# Patient Record
Sex: MALE | Race: OTHER | HISPANIC OR LATINO | Employment: UNEMPLOYED | URBAN - METROPOLITAN AREA
[De-identification: names, ages, dates, MRNs, and addresses within clinical notes are randomized per-mention and may not be internally consistent; named-entity substitution may affect disease eponyms.]

---

## 2020-07-21 ENCOUNTER — HOSPITAL ENCOUNTER (INPATIENT)
Facility: MEDICAL CENTER | Age: 20
LOS: 2 days | DRG: 683 | End: 2020-07-23
Attending: EMERGENCY MEDICINE | Admitting: HOSPITALIST

## 2020-07-21 ENCOUNTER — APPOINTMENT (OUTPATIENT)
Dept: RADIOLOGY | Facility: MEDICAL CENTER | Age: 20
DRG: 683 | End: 2020-07-21
Attending: EMERGENCY MEDICINE

## 2020-07-21 DIAGNOSIS — N17.9 ACUTE RENAL FAILURE, UNSPECIFIED ACUTE RENAL FAILURE TYPE (HCC): ICD-10-CM

## 2020-07-21 DIAGNOSIS — R10.84 GENERALIZED ABDOMINAL PAIN: ICD-10-CM

## 2020-07-21 DIAGNOSIS — R11.2 NON-INTRACTABLE VOMITING WITH NAUSEA, UNSPECIFIED VOMITING TYPE: ICD-10-CM

## 2020-07-21 LAB
ALBUMIN SERPL BCP-MCNC: 6.7 G/DL (ref 3.2–4.9)
ALBUMIN/GLOB SERPL: 1.9 G/DL
ALP SERPL-CCNC: 122 U/L (ref 30–99)
ALT SERPL-CCNC: 24 U/L (ref 2–50)
ANION GAP SERPL CALC-SCNC: 31 MMOL/L (ref 7–16)
APAP SERPL-MCNC: <5 UG/ML (ref 10–30)
APPEARANCE UR: ABNORMAL
AST SERPL-CCNC: 48 U/L (ref 12–45)
BACTERIA #/AREA URNS HPF: ABNORMAL /HPF
BASOPHILS # BLD AUTO: 0.5 % (ref 0–1.8)
BASOPHILS # BLD: 0.06 K/UL (ref 0–0.12)
BILIRUB SERPL-MCNC: 1.5 MG/DL (ref 0.1–1.5)
BILIRUB UR QL STRIP.AUTO: ABNORMAL
BLOOD CULTURE HOLD CXBCH: NORMAL
BLOOD CULTURE HOLD CXBCH: NORMAL
BUN SERPL-MCNC: 40 MG/DL (ref 8–22)
CALCIUM SERPL-MCNC: 11.1 MG/DL (ref 8.5–10.5)
CHLORIDE SERPL-SCNC: 82 MMOL/L (ref 96–112)
CO2 SERPL-SCNC: 20 MMOL/L (ref 20–33)
COLOR UR: ABNORMAL
CREAT SERPL-MCNC: 4.31 MG/DL (ref 0.5–1.4)
EKG IMPRESSION: NORMAL
EOSINOPHIL # BLD AUTO: 0.11 K/UL (ref 0–0.51)
EOSINOPHIL NFR BLD: 0.9 % (ref 0–6.9)
EPI CELLS #/AREA URNS HPF: ABNORMAL /HPF
ERYTHROCYTE [DISTWIDTH] IN BLOOD BY AUTOMATED COUNT: 38.5 FL (ref 35.9–50)
GLOBULIN SER CALC-MCNC: 3.5 G/DL (ref 1.9–3.5)
GLUCOSE SERPL-MCNC: 107 MG/DL (ref 65–99)
GLUCOSE UR STRIP.AUTO-MCNC: NEGATIVE MG/DL
HCT VFR BLD AUTO: 58.5 % (ref 42–52)
HGB BLD-MCNC: 19.5 G/DL (ref 14–18)
IMM GRANULOCYTES # BLD AUTO: 0.05 K/UL (ref 0–0.11)
IMM GRANULOCYTES NFR BLD AUTO: 0.4 % (ref 0–0.9)
KETONES UR STRIP.AUTO-MCNC: ABNORMAL MG/DL
LEUKOCYTE ESTERASE UR QL STRIP.AUTO: NEGATIVE
LIPASE SERPL-CCNC: 14 U/L (ref 11–82)
LYMPHOCYTES # BLD AUTO: 2.75 K/UL (ref 1–4.8)
LYMPHOCYTES NFR BLD: 22 % (ref 22–41)
MAGNESIUM SERPL-MCNC: 2.6 MG/DL (ref 1.5–2.5)
MCH RBC QN AUTO: 28.7 PG (ref 27–33)
MCHC RBC AUTO-ENTMCNC: 33.3 G/DL (ref 33.7–35.3)
MCV RBC AUTO: 86.2 FL (ref 81.4–97.8)
MICRO URNS: ABNORMAL
MONOCYTES # BLD AUTO: 1.33 K/UL (ref 0–0.85)
MONOCYTES NFR BLD AUTO: 10.6 % (ref 0–13.4)
NEUTROPHILS # BLD AUTO: 8.19 K/UL (ref 1.82–7.42)
NEUTROPHILS NFR BLD: 65.6 % (ref 44–72)
NITRITE UR QL STRIP.AUTO: NEGATIVE
NRBC # BLD AUTO: 0 K/UL
NRBC BLD-RTO: 0 /100 WBC
PH UR STRIP.AUTO: 5 [PH] (ref 5–8)
PLATELET # BLD AUTO: 311 K/UL (ref 164–446)
PMV BLD AUTO: 10.9 FL (ref 9–12.9)
POTASSIUM SERPL-SCNC: 4.7 MMOL/L (ref 3.6–5.5)
PROT SERPL-MCNC: 10.2 G/DL (ref 6–8.2)
PROT UR QL STRIP: 100 MG/DL
RBC # BLD AUTO: 6.79 M/UL (ref 4.7–6.1)
RBC # URNS HPF: ABNORMAL /HPF
RBC UR QL AUTO: NEGATIVE
SALICYLATES SERPL-MCNC: <1 MG/DL (ref 15–25)
SODIUM SERPL-SCNC: 133 MMOL/L (ref 135–145)
SP GR UR STRIP.AUTO: 1.02
UROBILINOGEN UR STRIP.AUTO-MCNC: 1 MG/DL
WBC # BLD AUTO: 12.5 K/UL (ref 4.8–10.8)
WBC #/AREA URNS HPF: ABNORMAL /HPF

## 2020-07-21 PROCEDURE — 76775 US EXAM ABDO BACK WALL LIM: CPT

## 2020-07-21 PROCEDURE — 770006 HCHG ROOM/CARE - MED/SURG/GYN SEMI*

## 2020-07-21 PROCEDURE — 700105 HCHG RX REV CODE 258: Performed by: STUDENT IN AN ORGANIZED HEALTH CARE EDUCATION/TRAINING PROGRAM

## 2020-07-21 PROCEDURE — 99285 EMERGENCY DEPT VISIT HI MDM: CPT

## 2020-07-21 PROCEDURE — 93005 ELECTROCARDIOGRAM TRACING: CPT | Performed by: EMERGENCY MEDICINE

## 2020-07-21 PROCEDURE — 82803 BLOOD GASES ANY COMBINATION: CPT

## 2020-07-21 PROCEDURE — 96375 TX/PRO/DX INJ NEW DRUG ADDON: CPT

## 2020-07-21 PROCEDURE — 80053 COMPREHEN METABOLIC PANEL: CPT

## 2020-07-21 PROCEDURE — 74176 CT ABD & PELVIS W/O CONTRAST: CPT

## 2020-07-21 PROCEDURE — 700111 HCHG RX REV CODE 636 W/ 250 OVERRIDE (IP): Performed by: EMERGENCY MEDICINE

## 2020-07-21 PROCEDURE — 700105 HCHG RX REV CODE 258: Performed by: EMERGENCY MEDICINE

## 2020-07-21 PROCEDURE — 96365 THER/PROPH/DIAG IV INF INIT: CPT

## 2020-07-21 PROCEDURE — 83690 ASSAY OF LIPASE: CPT

## 2020-07-21 PROCEDURE — 81001 URINALYSIS AUTO W/SCOPE: CPT

## 2020-07-21 PROCEDURE — 36415 COLL VENOUS BLD VENIPUNCTURE: CPT

## 2020-07-21 PROCEDURE — 85025 COMPLETE CBC W/AUTO DIFF WBC: CPT

## 2020-07-21 PROCEDURE — 93005 ELECTROCARDIOGRAM TRACING: CPT

## 2020-07-21 PROCEDURE — 96367 TX/PROPH/DG ADDL SEQ IV INF: CPT

## 2020-07-21 PROCEDURE — 700101 HCHG RX REV CODE 250: Performed by: EMERGENCY MEDICINE

## 2020-07-21 PROCEDURE — 80307 DRUG TEST PRSMV CHEM ANLYZR: CPT

## 2020-07-21 PROCEDURE — 83735 ASSAY OF MAGNESIUM: CPT

## 2020-07-21 RX ORDER — SODIUM CHLORIDE, SODIUM LACTATE, POTASSIUM CHLORIDE, AND CALCIUM CHLORIDE .6; .31; .03; .02 G/100ML; G/100ML; G/100ML; G/100ML
1000 INJECTION, SOLUTION INTRAVENOUS ONCE
Status: DISCONTINUED | OUTPATIENT
Start: 2020-07-21 | End: 2020-07-21

## 2020-07-21 RX ORDER — SODIUM CHLORIDE 9 MG/ML
1000 INJECTION, SOLUTION INTRAVENOUS ONCE
Status: COMPLETED | OUTPATIENT
Start: 2020-07-21 | End: 2020-07-21

## 2020-07-21 RX ORDER — POLYETHYLENE GLYCOL 3350 17 G/17G
1 POWDER, FOR SOLUTION ORAL
Status: DISCONTINUED | OUTPATIENT
Start: 2020-07-21 | End: 2020-07-23 | Stop reason: HOSPADM

## 2020-07-21 RX ORDER — ONDANSETRON 2 MG/ML
4 INJECTION INTRAMUSCULAR; INTRAVENOUS ONCE
Status: COMPLETED | OUTPATIENT
Start: 2020-07-21 | End: 2020-07-21

## 2020-07-21 RX ORDER — LABETALOL HYDROCHLORIDE 5 MG/ML
10 INJECTION, SOLUTION INTRAVENOUS EVERY 4 HOURS PRN
Status: DISCONTINUED | OUTPATIENT
Start: 2020-07-21 | End: 2020-07-23 | Stop reason: HOSPADM

## 2020-07-21 RX ORDER — MORPHINE SULFATE 4 MG/ML
4 INJECTION, SOLUTION INTRAMUSCULAR; INTRAVENOUS ONCE
Status: COMPLETED | OUTPATIENT
Start: 2020-07-21 | End: 2020-07-21

## 2020-07-21 RX ORDER — ACETAMINOPHEN 325 MG/1
650 TABLET ORAL EVERY 6 HOURS PRN
Status: DISCONTINUED | OUTPATIENT
Start: 2020-07-21 | End: 2020-07-23 | Stop reason: HOSPADM

## 2020-07-21 RX ORDER — SODIUM CHLORIDE, SODIUM LACTATE, POTASSIUM CHLORIDE, CALCIUM CHLORIDE 600; 310; 30; 20 MG/100ML; MG/100ML; MG/100ML; MG/100ML
1000 INJECTION, SOLUTION INTRAVENOUS ONCE
Status: COMPLETED | OUTPATIENT
Start: 2020-07-21 | End: 2020-07-21

## 2020-07-21 RX ORDER — HEPARIN SODIUM 5000 [USP'U]/ML
5000 INJECTION, SOLUTION INTRAVENOUS; SUBCUTANEOUS EVERY 8 HOURS
Status: DISCONTINUED | OUTPATIENT
Start: 2020-07-21 | End: 2020-07-23 | Stop reason: HOSPADM

## 2020-07-21 RX ORDER — SODIUM CHLORIDE 9 MG/ML
3000 INJECTION, SOLUTION INTRAVENOUS CONTINUOUS
Status: DISCONTINUED | OUTPATIENT
Start: 2020-07-21 | End: 2020-07-22

## 2020-07-21 RX ORDER — AMOXICILLIN 250 MG
2 CAPSULE ORAL 2 TIMES DAILY
Status: DISCONTINUED | OUTPATIENT
Start: 2020-07-21 | End: 2020-07-23 | Stop reason: HOSPADM

## 2020-07-21 RX ORDER — BISACODYL 10 MG
10 SUPPOSITORY, RECTAL RECTAL
Status: DISCONTINUED | OUTPATIENT
Start: 2020-07-21 | End: 2020-07-23 | Stop reason: HOSPADM

## 2020-07-21 RX ADMIN — SODIUM CHLORIDE 1000 ML: 9 INJECTION, SOLUTION INTRAVENOUS at 21:04

## 2020-07-21 RX ADMIN — CEFTRIAXONE SODIUM 2 G: 2 INJECTION, POWDER, FOR SOLUTION INTRAMUSCULAR; INTRAVENOUS at 18:30

## 2020-07-21 RX ADMIN — MORPHINE SULFATE 4 MG: 4 INJECTION INTRAVENOUS at 18:20

## 2020-07-21 RX ADMIN — SODIUM CHLORIDE 1000 ML: 9 INJECTION, SOLUTION INTRAVENOUS at 23:54

## 2020-07-21 RX ADMIN — ONDANSETRON 4 MG: 2 INJECTION INTRAMUSCULAR; INTRAVENOUS at 18:20

## 2020-07-21 RX ADMIN — SODIUM CHLORIDE, POTASSIUM CHLORIDE, SODIUM LACTATE AND CALCIUM CHLORIDE 1000 ML: 600; 310; 30; 20 INJECTION, SOLUTION INTRAVENOUS at 18:24

## 2020-07-21 RX ADMIN — METRONIDAZOLE 500 MG: 500 INJECTION, SOLUTION INTRAVENOUS at 19:03

## 2020-07-21 SDOH — HEALTH STABILITY: MENTAL HEALTH: HOW OFTEN DO YOU HAVE A DRINK CONTAINING ALCOHOL?: 2-4 TIMES A MONTH

## 2020-07-21 NOTE — ED TRIAGE NOTES
Pt to triage via w/c c/o epigastric pain with vomiting since yesterday. Pt states unable to hold down water. Pt denies diarrhea. Pt denies fever. Denies drinking or drugs

## 2020-07-22 PROBLEM — E87.8 HYPOCHLOREMIA: Status: ACTIVE | Noted: 2020-07-22

## 2020-07-22 PROBLEM — N39.0 URINARY TRACT INFECTION: Status: ACTIVE | Noted: 2020-07-22

## 2020-07-22 PROBLEM — D75.1 POLYCYTHEMIA: Status: ACTIVE | Noted: 2020-07-22

## 2020-07-22 PROBLEM — N17.9 ACUTE KIDNEY INJURY (HCC): Status: ACTIVE | Noted: 2020-07-22

## 2020-07-22 PROBLEM — E87.29 HIGH ANION GAP METABOLIC ACIDOSIS: Status: ACTIVE | Noted: 2020-07-22

## 2020-07-22 LAB
ALBUMIN SERPL BCP-MCNC: 4.3 G/DL (ref 3.2–4.9)
ALBUMIN/GLOB SERPL: 1.5 G/DL
ALP SERPL-CCNC: 85 U/L (ref 30–99)
ALT SERPL-CCNC: 16 U/L (ref 2–50)
AMPHET UR QL SCN: POSITIVE
ANION GAP SERPL CALC-SCNC: 17 MMOL/L (ref 7–16)
ANION GAP SERPL CALC-SCNC: 17 MMOL/L (ref 7–16)
AST SERPL-CCNC: 34 U/L (ref 12–45)
BARBITURATES UR QL SCN: NEGATIVE
BASE EXCESS BLDA CALC-SCNC: -2 MMOL/L (ref -4–3)
BASOPHILS # BLD AUTO: 0.6 % (ref 0–1.8)
BASOPHILS # BLD: 0.07 K/UL (ref 0–0.12)
BENZODIAZ UR QL SCN: NEGATIVE
BILIRUB SERPL-MCNC: 1.3 MG/DL (ref 0.1–1.5)
BODY TEMPERATURE: NORMAL CENTIGRADE
BUN SERPL-MCNC: 31 MG/DL (ref 8–22)
BUN SERPL-MCNC: 32 MG/DL (ref 8–22)
BZE UR QL SCN: POSITIVE
CALCIUM SERPL-MCNC: 9.3 MG/DL (ref 8.5–10.5)
CALCIUM SERPL-MCNC: 9.3 MG/DL (ref 8.5–10.5)
CANNABINOIDS UR QL SCN: POSITIVE
CHLORIDE SERPL-SCNC: 96 MMOL/L (ref 96–112)
CHLORIDE SERPL-SCNC: 98 MMOL/L (ref 96–112)
CK MB SERPL-MCNC: 6.3 NG/ML (ref 0–5)
CK SERPL-CCNC: 447 U/L (ref 0–154)
CO2 SERPL-SCNC: 22 MMOL/L (ref 20–33)
CO2 SERPL-SCNC: 22 MMOL/L (ref 20–33)
CREAT SERPL-MCNC: 1.74 MG/DL (ref 0.5–1.4)
CREAT SERPL-MCNC: 1.78 MG/DL (ref 0.5–1.4)
EOSINOPHIL # BLD AUTO: 0.15 K/UL (ref 0–0.51)
EOSINOPHIL NFR BLD: 1.4 % (ref 0–6.9)
ERYTHROCYTE [DISTWIDTH] IN BLOOD BY AUTOMATED COUNT: 40.3 FL (ref 35.9–50)
ETHANOL BLD-MCNC: <10.1 MG/DL (ref 0–10.1)
GLOBULIN SER CALC-MCNC: 2.9 G/DL (ref 1.9–3.5)
GLUCOSE SERPL-MCNC: 94 MG/DL (ref 65–99)
GLUCOSE SERPL-MCNC: 97 MG/DL (ref 65–99)
HCO3 BLDA-SCNC: 22 MMOL/L (ref 17–25)
HCT VFR BLD AUTO: 47.6 % (ref 42–52)
HGB BLD-MCNC: 15.4 G/DL (ref 14–18)
IMM GRANULOCYTES # BLD AUTO: 0.04 K/UL (ref 0–0.11)
IMM GRANULOCYTES NFR BLD AUTO: 0.4 % (ref 0–0.9)
LACTATE BLD-SCNC: 0.9 MMOL/L (ref 0.5–2)
LACTATE BLD-SCNC: 1.3 MMOL/L (ref 0.5–2)
LACTATE BLD-SCNC: 1.5 MMOL/L (ref 0.5–2)
LYMPHOCYTES # BLD AUTO: 2.75 K/UL (ref 1–4.8)
LYMPHOCYTES NFR BLD: 25.2 % (ref 22–41)
MCH RBC QN AUTO: 28.6 PG (ref 27–33)
MCHC RBC AUTO-ENTMCNC: 32.4 G/DL (ref 33.7–35.3)
MCV RBC AUTO: 88.5 FL (ref 81.4–97.8)
METHADONE UR QL SCN: NEGATIVE
MONOCYTES # BLD AUTO: 1.37 K/UL (ref 0–0.85)
MONOCYTES NFR BLD AUTO: 12.5 % (ref 0–13.4)
NEUTROPHILS # BLD AUTO: 6.54 K/UL (ref 1.82–7.42)
NEUTROPHILS NFR BLD: 59.9 % (ref 44–72)
NRBC # BLD AUTO: 0 K/UL
NRBC BLD-RTO: 0 /100 WBC
OPIATES UR QL SCN: NEGATIVE
OSMOLALITY SERPL: 294 MOSM/KG H2O (ref 278–298)
OXYCODONE UR QL SCN: NEGATIVE
PCO2 BLDA: 36.4 MMHG (ref 26–37)
PCP UR QL SCN: NEGATIVE
PH BLDA: 7.41 [PH] (ref 7.4–7.5)
PLATELET # BLD AUTO: 234 K/UL (ref 164–446)
PMV BLD AUTO: 11.1 FL (ref 9–12.9)
PO2 BLDA: 86.3 MMHG (ref 64–87)
POTASSIUM SERPL-SCNC: 4.1 MMOL/L (ref 3.6–5.5)
POTASSIUM SERPL-SCNC: 4.2 MMOL/L (ref 3.6–5.5)
PROPOXYPH UR QL SCN: NEGATIVE
PROT SERPL-MCNC: 7.2 G/DL (ref 6–8.2)
RBC # BLD AUTO: 5.38 M/UL (ref 4.7–6.1)
SAO2 % BLDA: 95.6 % (ref 93–99)
SODIUM SERPL-SCNC: 135 MMOL/L (ref 135–145)
SODIUM SERPL-SCNC: 137 MMOL/L (ref 135–145)
WBC # BLD AUTO: 10.9 K/UL (ref 4.8–10.8)

## 2020-07-22 PROCEDURE — A9270 NON-COVERED ITEM OR SERVICE: HCPCS | Performed by: STUDENT IN AN ORGANIZED HEALTH CARE EDUCATION/TRAINING PROGRAM

## 2020-07-22 PROCEDURE — 80048 BASIC METABOLIC PNL TOTAL CA: CPT

## 2020-07-22 PROCEDURE — 700102 HCHG RX REV CODE 250 W/ 637 OVERRIDE(OP): Performed by: STUDENT IN AN ORGANIZED HEALTH CARE EDUCATION/TRAINING PROGRAM

## 2020-07-22 PROCEDURE — 36415 COLL VENOUS BLD VENIPUNCTURE: CPT

## 2020-07-22 PROCEDURE — 83605 ASSAY OF LACTIC ACID: CPT | Mod: 91

## 2020-07-22 PROCEDURE — 82550 ASSAY OF CK (CPK): CPT

## 2020-07-22 PROCEDURE — 770006 HCHG ROOM/CARE - MED/SURG/GYN SEMI*

## 2020-07-22 PROCEDURE — 82553 CREATINE MB FRACTION: CPT

## 2020-07-22 PROCEDURE — 80053 COMPREHEN METABOLIC PANEL: CPT

## 2020-07-22 PROCEDURE — 83930 ASSAY OF BLOOD OSMOLALITY: CPT

## 2020-07-22 PROCEDURE — 700111 HCHG RX REV CODE 636 W/ 250 OVERRIDE (IP): Performed by: STUDENT IN AN ORGANIZED HEALTH CARE EDUCATION/TRAINING PROGRAM

## 2020-07-22 PROCEDURE — 80307 DRUG TEST PRSMV CHEM ANLYZR: CPT

## 2020-07-22 PROCEDURE — 99223 1ST HOSP IP/OBS HIGH 75: CPT | Performed by: HOSPITALIST

## 2020-07-22 PROCEDURE — 85025 COMPLETE CBC W/AUTO DIFF WBC: CPT

## 2020-07-22 PROCEDURE — 700105 HCHG RX REV CODE 258: Performed by: STUDENT IN AN ORGANIZED HEALTH CARE EDUCATION/TRAINING PROGRAM

## 2020-07-22 PROCEDURE — 82668 ASSAY OF ERYTHROPOIETIN: CPT

## 2020-07-22 RX ADMIN — DOCUSATE SODIUM 50 MG AND SENNOSIDES 8.6 MG 2 TABLET: 8.6; 5 TABLET, FILM COATED ORAL at 05:56

## 2020-07-22 RX ADMIN — HEPARIN SODIUM 5000 UNITS: 5000 INJECTION, SOLUTION INTRAVENOUS; SUBCUTANEOUS at 00:00

## 2020-07-22 RX ADMIN — DOCUSATE SODIUM 50 MG AND SENNOSIDES 8.6 MG 2 TABLET: 8.6; 5 TABLET, FILM COATED ORAL at 17:38

## 2020-07-22 RX ADMIN — HEPARIN SODIUM 5000 UNITS: 5000 INJECTION, SOLUTION INTRAVENOUS; SUBCUTANEOUS at 05:56

## 2020-07-22 RX ADMIN — HEPARIN SODIUM 5000 UNITS: 5000 INJECTION, SOLUTION INTRAVENOUS; SUBCUTANEOUS at 14:56

## 2020-07-22 RX ADMIN — HEPARIN SODIUM 5000 UNITS: 5000 INJECTION, SOLUTION INTRAVENOUS; SUBCUTANEOUS at 21:30

## 2020-07-22 RX ADMIN — DOCUSATE SODIUM 50 MG AND SENNOSIDES 8.6 MG 2 TABLET: 8.6; 5 TABLET, FILM COATED ORAL at 00:00

## 2020-07-22 RX ADMIN — SODIUM CHLORIDE 3000 ML: 9 INJECTION, SOLUTION INTRAVENOUS at 01:23

## 2020-07-22 SDOH — ECONOMIC STABILITY: FOOD INSECURITY: WITHIN THE PAST 12 MONTHS, THE FOOD YOU BOUGHT JUST DIDN'T LAST AND YOU DIDN'T HAVE MONEY TO GET MORE.: NEVER TRUE

## 2020-07-22 SDOH — ECONOMIC STABILITY: FOOD INSECURITY: WITHIN THE PAST 12 MONTHS, YOU WORRIED THAT YOUR FOOD WOULD RUN OUT BEFORE YOU GOT MONEY TO BUY MORE.: NEVER TRUE

## 2020-07-22 SDOH — ECONOMIC STABILITY: TRANSPORTATION INSECURITY
IN THE PAST 12 MONTHS, HAS LACK OF TRANSPORTATION KEPT YOU FROM MEETINGS, WORK, OR FROM GETTING THINGS NEEDED FOR DAILY LIVING?: NO

## 2020-07-22 SDOH — HEALTH STABILITY: MENTAL HEALTH: HOW OFTEN DO YOU HAVE 6 OR MORE DRINKS ON ONE OCCASION?: MONTHLY

## 2020-07-22 SDOH — ECONOMIC STABILITY: TRANSPORTATION INSECURITY
IN THE PAST 12 MONTHS, HAS THE LACK OF TRANSPORTATION KEPT YOU FROM MEDICAL APPOINTMENTS OR FROM GETTING MEDICATIONS?: NO

## 2020-07-22 SDOH — HEALTH STABILITY: MENTAL HEALTH: HOW MANY STANDARD DRINKS CONTAINING ALCOHOL DO YOU HAVE ON A TYPICAL DAY?: 5 OR 6

## 2020-07-22 ASSESSMENT — LIFESTYLE VARIABLES
TOTAL SCORE: 3
HOW MANY TIMES IN THE PAST YEAR HAVE YOU HAD 5 OR MORE DRINKS IN A DAY: 12
AVERAGE NUMBER OF DAYS PER WEEK YOU HAVE A DRINK CONTAINING ALCOHOL: 0.13
HAVE PEOPLE ANNOYED YOU BY CRITICIZING YOUR DRINKING: YES
TOTAL SCORE: 3
TOTAL SCORE: 3
EVER FELT BAD OR GUILTY ABOUT YOUR DRINKING: NO
ALCOHOL_USE: YES
CONSUMPTION TOTAL: POSITIVE
EVER_SMOKED: NEVER
HAVE YOU EVER FELT YOU SHOULD CUT DOWN ON YOUR DRINKING: YES
DOES PATIENT WANT TO TALK TO SOMEONE ABOUT QUITTING: YES
SUBSTANCE_ABUSE: 1
DOES PATIENT WANT TO STOP DRINKING: YES
ON A TYPICAL DAY WHEN YOU DRINK ALCOHOL HOW MANY DRINKS DO YOU HAVE: 6
EVER HAD A DRINK FIRST THING IN THE MORNING TO STEADY YOUR NERVES TO GET RID OF A HANGOVER: YES

## 2020-07-22 ASSESSMENT — COGNITIVE AND FUNCTIONAL STATUS - GENERAL
SUGGESTED CMS G CODE MODIFIER MOBILITY: CH
SUGGESTED CMS G CODE MODIFIER DAILY ACTIVITY: CH
MOBILITY SCORE: 24
DAILY ACTIVITIY SCORE: 24

## 2020-07-22 ASSESSMENT — ENCOUNTER SYMPTOMS
DIZZINESS: 1
PALPITATIONS: 0
WEAKNESS: 0
BLURRED VISION: 0
SENSORY CHANGE: 0
ABDOMINAL PAIN: 1
HEADACHES: 0
SHORTNESS OF BREATH: 0
WHEEZING: 0
COUGH: 0
NECK PAIN: 0
WEIGHT LOSS: 0
FEVER: 0
NERVOUS/ANXIOUS: 0
NAUSEA: 1
CHILLS: 0
DOUBLE VISION: 0
CONSTIPATION: 0
DIARRHEA: 0
MYALGIAS: 0
SORE THROAT: 0
BLOOD IN STOOL: 0
VOMITING: 1

## 2020-07-22 ASSESSMENT — PATIENT HEALTH QUESTIONNAIRE - PHQ9
1. LITTLE INTEREST OR PLEASURE IN DOING THINGS: NOT AT ALL
SUM OF ALL RESPONSES TO PHQ9 QUESTIONS 1 AND 2: 0
2. FEELING DOWN, DEPRESSED, IRRITABLE, OR HOPELESS: NOT AT ALL

## 2020-07-22 NOTE — ED NOTES
Med rec updated and complete. Allergies reviewed.   Pt denies taking medications.  No preferred home pharmacy

## 2020-07-22 NOTE — PROGRESS NOTES
2 RN Skin Check    2 RN skin check completed with Alexandra HADLEY.  Devices in place: 2 PIV  Skin assessed under devices: Yes  Confirmed pressure ulcers found on: N/A.  New potential pressure ulcers noted on N/A. Wound consult placed N/A      The following interventions in place : pillows for support/positioning, encourage frequent turns    Skin Assessment    Ears pink, blanching.  Bilateral elbows pink, blanching.  Sacrum pink, blanching.  Bilateral heels pink, blanching.

## 2020-07-22 NOTE — ED NOTES
Pt report called in to Rebeka HADLEY, questions and comments addressed. Pt now awaiting transport to [place] [id number].

## 2020-07-22 NOTE — ASSESSMENT & PLAN NOTE
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  -Drugs: pt has polysubstance abuse, including crystal meth, and UDS (+) for cannabis, cocaine, amphetamines  -RPGN: IgA nephropathy (pt reports cafecolored urine)    Lower on differential:  -Unlikely malignancy as CBC wnl  -Infxn as WBC wnl  -TTP/HUS r/o as CBC is wnl    --> F/u on urine qual exam and CKMB (r/o myoglobinuria w/ rhabdo)  --> No casts or crystals on UA  --> F/u on acetaminophen, salicylate labs  --> Day team to consider kidney biopsy for r/o of IgA nephropathy  --> Day team to consider another doppler flow studies of renal arteries to look for renal artery stenosis, or vein thrombosis    -->Continue aggressive fluid hydration  -->Does not meet any indication for emergent dialysis

## 2020-07-22 NOTE — ED NOTES
Pt w/c to [facility]. Agree with triage note. Pt provided urine sample, specimen sent to lab. Pt changed into gown and placed on monitor.  Chart up and ready for ERP now.

## 2020-07-22 NOTE — ED PROVIDER NOTES
ED Provider Note    Scribed for Omari Hastings M.D. by Patricia Oswald. 7/21/2020  6:21 PM    Means of arrival: wheel chair  History obtained from: Patient  History limited by: None    CHIEF COMPLAINT  Chief Complaint   Patient presents with   • Abdominal Pain   • N/V       HPI  Aki Duron is a 19 y.o. male who presents to the Emergency Department for evaluation of vomiting which began yesterday and increased in frequency and severity today and was associated with diffuse abdominal pain which failed to localize in the last 24 hours and was not associated with fever.  The patient denies any toxic ingestion, drug use pattern except for marijuana but does admit to some sort of muscle building aid over-the-counter.  Patient moved to the [facility] from [place] 1 year ago.  Does not have any exposure history that is concerning    REVIEW OF SYSTEMS  Pertinent negatives include no fever. As above, all other systems reviewed and are negative.   See HPI for further details.     PAST MEDICAL HISTORY   No pertinent past medical history noted    SURGICAL HISTORY  patient denies any surgical history    SOCIAL HISTORY  Social History     Tobacco Use   • Smoking status: Never Smoker   • Smokeless tobacco: Never Used   Substance Use Topics   • Alcohol use: Yes     Frequency: 2-4 times a month   • Drug use: Not Currently      Social History     Substance and Sexual Activity   Drug Use Not Currently       FAMILY HISTORY  History reviewed. No pertinent family history.    CURRENT MEDICATIONS  Home Medications     Reviewed by Rajni Shelton (Pharmacy Tech) on 07/21/20 at 2044  Med List Status: Partial   Medication Last Dose Status        Patient Js Taking any Medications                       ALLERGIES  No Known Allergies    PHYSICAL EXAM  VITAL SIGNS: /88   Pulse (!) 153   Temp 37 °C (98.6 °F) (Oral)   Resp (!) 24   Wt 72.6 kg (160 lb)   SpO2 99%   Constitutional: Well developed, Well nourished, No acute distress,  Non-toxic appearance.   HENT: Normocephalic, Atraumatic, Bilateral external ears normal, Oropharynx is clear mucous membranes are dry. No oral exudates or nasal discharge.   Eyes: Pupils are equal round and reactive, EOMI, Conjunctiva normal, No discharge.   Neck: Normal range of motion, No tenderness, Supple, No stridor. No meningismus.  Lymphatic: No lymphadenopathy noted.   Cardiovascular: Tachycardic rate and rhythm without murmur rub or gallop.  Thorax & Lungs: Clear breath sounds bilaterally without wheezes, rhonchi or rales. There is no chest wall tenderness.   Abdomen: Soft with diffuse tenderness, the abdomen is otherwise scaphoid and non-distended. There is no rebound or guarding. No organomegaly is appreciated. Bowel sounds are normal.  Skin: Normal without rash.   Back: No CVA or spinal tenderness.   Extremities: Intact distal pulses, No edema, No tenderness, No cyanosis, No clubbing. Capillary refill is less than 2 seconds.  Musculoskeletal: Good range of motion in all major joints. No tenderness to palpation or major deformities noted.   Neurologic: Alert & oriented x 3, Normal motor function, Normal sensory function, No focal deficits noted. Reflexes are normal.  Psychiatric: Affect normal, Judgment normal, Mood normal. There is no suicidal ideation or patient reported hallucinations.       DIAGNOSTIC STUDIES / PROCEDURES    LABS  Labs Reviewed   CBC WITH DIFFERENTIAL - Abnormal; Notable for the following components:       Result Value    WBC 12.5 (*)     RBC 6.79 (*)     Hemoglobin 19.5 (*)     Hematocrit 58.5 (*)     MCHC 33.3 (*)     Neutrophils (Absolute) 8.19 (*)     Monos (Absolute) 1.33 (*)     All other components within normal limits   COMP METABOLIC PANEL - Abnormal; Notable for the following components:    Sodium 133 (*)     Chloride 82 (*)     Anion Gap 31.0 (*)     Glucose 107 (*)     Bun 40 (*)     Creatinine 4.31 (*)     Calcium 11.1 (*)     AST(SGOT) 48 (*)     Alkaline Phosphatase 122  (*)     Albumin 6.7 (*)     Total Protein 10.2 (*)     All other components within normal limits   URINALYSIS,CULTURE IF INDICATED - Abnormal; Notable for the following components:    Character Turbid (*)     Ketones Trace (*)     Protein 100 (*)     Bilirubin Moderate (*)     All other components within normal limits   URINE MICROSCOPIC (W/UA) - Abnormal; Notable for the following components:    WBC 2-5 (*)     RBC 2-5 (*)     Bacteria Few (*)     All other components within normal limits   ESTIMATED GFR - Abnormal; Notable for the following components:    GFR If  21 (*)     GFR If Non  18 (*)     All other components within normal limits   LIPASE   BLOOD CULTURE,HOLD   BLOOD CULTURE,HOLD   URINALYSIS      All labs reviewed by me.    EKG Interpretation:  Results for orders placed or performed during the hospital encounter of 20   EKG   Result Value Ref Range    Report       [facility]    Test Date:  2020  Pt Name:    JAYSON DEAL                Department: ER  MRN:        [id number]                      Room:  Gender:     Male                         Technician: [id number]  :        2000                   Requested By:ER TRIAGE PROTOCOL  Order #:    [id number]                    Reading MD:    Measurements  Intervals                                Axis  Rate:       82                           P:          26  FL:         108                          QRS:        113  QRSD:       94                           T:          54  QT:         396  QTc:        463    Interpretive Statements  SINUS RHYTHM  SHORT FL INTERVAL, ACCELERATED AV CONDUCTION  LEFT POSTERIOR FASCICULAR BLOCK  ST ELEV, PROBABLE NORMAL EARLY REPOL PATTERN  No previous ECG available for comparison         RADIOLOGY  CT-ABDOMEN-PELVIS W/O   Final Result         1.  There is no acute inflammatory process within the abdomen or pelvis.   2.  There is no evidence of  nephrolithiasis, urolithiasis, or hydronephrosis.      US-RENAL    (Results Pending)     The radiologist's interpretation of all radiological studies have been reviewed by me.    COURSE & MEDICAL DECISION MAKING  Nursing notes, VS, PMSFHx reviewed in chart.    6:21 PM Patient seen and examined at bedside. Ordered for abdomen pelvis CT and labwork to evaluate.  I was concerned about the possibility of appendicitis and he was tachycardic and therefore given antibiotics and fluids early.  Patient was treated with 4mg IV Morphine, 4mg IV zofran, 2g IV Rocephin and 500mg IV Flagyl for his symptoms. I informed the patient that I would evaluate for any acute process with imaging, lab work and EKG, and begin medications to help manage his discomforts and begin treatment. Patient understands and agrees with treatment plan.    Laboratory evaluation reveals he has acute renal failure with a BUN and creatinine of 40 and 4.31.  This is a ratio consistent with a intrinsic renal disease.  Does have hypercalcemia of unclear etiology at 11.1.  Mild elevation of alk phos and a significant anion gap of 31.  Am concerned about renal failure as a result of hypercalcemia and intrinsic renal disease versus malignancy is considered    7:02 PM I re-evaluated patient at bedside and updated him on his workup results from today.  Patient at this point admitted that he has been taking an over-the-counter muscle support supplement but cannot recall the name    7:45 PM Paged Hospitalist    7:46 PM Spoke with Dr. Jeyson Leigh, Hospitalist, about the patient's condition. Agrees to evaluate the patient for admission.  Patient will be hydrated and nephrology will be consulted    Please note a critical care time of 30 minutes is spent the care of this patient outside of procedure time.  At risk system is kidney and with a metabolic acidosis    DISPOSITION:  Patient will be hospitalized by Hospitalist service in guarded condition.     FINAL IMPRESSION  1.  Acute renal failure, unspecified acute renal failure type (HCC)    2. Generalized abdominal pain    3. Non-intractable vomiting with nausea, unspecified vomiting type    Critical care time, 30 minutes     IPatricia (Scribe), am scribing for, and in the presence of, Omari Hastings M.D..    Electronically signed by: Patircia Oswald (Scribe), 7/21/2020    IOmari M.D. personally performed the services described in this documentation, as scribed by Patricia Oswald in my presence, and it is both accurate and complete.    The note accurately reflects work and decisions made by me.  Omari Hastings M.D.  7/21/2020  10:09 PM

## 2020-07-22 NOTE — ED NOTES
Pt updated on poc and admit plans. PIV remains CDI and patent.   Room checked and all pt belongings transported with pt. Pt to [id number] now with transport via gurney.

## 2020-07-22 NOTE — PROGRESS NOTES
19-year-old male with abdominal pain nausea vomiting and KRISSY no history of chronic medical problems  UA pending  Patient will be admitted by R internal medicine

## 2020-07-22 NOTE — PROGRESS NOTES
Pt arrived @ 2305. Patient is AOx 4, on room air, with PIV in right AC and left forearm. Pt belongings are with them. Pt speaks only Indonesian. Krystyna HADLEY Alexandra interpreted. Pt denies pain. VSS. Patient oriented to the room, bed, and unit. Fall safety and call bell education provided.  Admission assessment completed. No further needs at this time.

## 2020-07-22 NOTE — H&P
"History & Physical Note    Date of Admission: 7/22/2020  Admission Status: Inpatient  [facility] Team: [facility] IM Purple Team  Attending: Dr. Ceron  Senior Resident: Dr. Rivera  Intern: Dr. Ohara  Contact Number: [phone]    Chief Complaint: abdominal pain, nausea/vomiting     History of Present Illness (HPI):    Patient is Namibian-speaking only  Aki Duron is a 19 y.o. male with no significant PMH who presented 7/21/2020 with 2 days of abdominal pain, nausea and vomiting. 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 Around 2 PM, after drinking his usual juice with \"vitamins\", pt started feeling dizzy as if everything was spinning, and vomited all of his lunch. Afterward, he attempted to drink water to re-hydrate but kept on vomiting and had to leave work. Throughout the next 24 hours, he continued trying to drink water and intake food but kept on throwing everything up. He decided to come to the hospital for treating his symptoms.    Of note, patient has been taking \"vitamins\" in juice for the past 11 months ever since he immigrated to the  in Sept 2019. 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 He revealed he has also been snorting crystal meth and smokes marijuana on the weekends since November 2019 with his friends. He normally drinks 1.5 gallons of water while at his construction job. He also drinks at least 7 beers a day on the weekend and sometimes up to 30 beers. Occasionally, when he doesn't have money, he drinks \"something\" that his friends give him that also gives him a similar effect as beer. He states that since he has arrived in the [place], he has been having \"cafe\"-colored urine, which he attributes to a change in diet after immigrating from [place].    Review of Systems:   Review of Systems   Constitutional: Negative for chills, fever and weight loss.   HENT: Negative for congestion and sore " "throat.    Eyes: Negative for blurred vision and double vision.   Respiratory: Negative for cough, shortness of breath and wheezing.    Cardiovascular: Negative for chest pain and palpitations.   Gastrointestinal: Positive for abdominal pain, nausea and vomiting. Negative for blood in stool, constipation and diarrhea.   Genitourinary: Negative for dysuria, frequency and urgency.        Cafe-colored urine   Musculoskeletal: Negative for myalgias and neck pain.   Skin: Negative for itching and rash.   Neurological: Positive for dizziness. Negative for sensory change, weakness and headaches.   Psychiatric/Behavioral: Positive for substance abuse. The patient is not nervous/anxious.        Past Medical History:   Past Medical History was reviewed with patient.   has no past medical history on file.    Past Surgical History: Past Surgical History was reviewed with patient.   has no past surgical history on file.    Medications: Medications have been reviewed with patient.  \"vitamins\" several times daily     Allergies: Allergies have been reviewed with patient.  No Known Allergies    Family History:   family history includes Cancer in his paternal uncle; Diabetes in his maternal grandmother; Hypertension in his maternal aunt.     Social History:   Tobacco: none   Alcohol: usually 6-7 beers on weekends daily, sometimes up to 30 beers  Recreational drugs (illegal and prescription):  Crystal meth and marijuana on weekends  Employment: construction  Activity Level: high  Living situation:  Lives with cousin in [place] since Sept 2019  Recent travel:  Travels regularly all over [place] for construction work (recently been to [place] and [place])  Primary Care Provider: reviewed No primary care provider on file.  Other (stressors, spirituality, exposures):  None  Physical Exam:   Vitals:  Temp:  [36.4 °C (97.6 °F)-37 °C (98.6 °F)] 36.7 °C (98 °F)  Pulse:  [] 67  Resp:  [12-24] 18  BP: (119-139)/(50-88) 133/50  SpO2:  [94 " %-99 %] 94 %    Physical Exam  Vitals signs and nursing note reviewed. Exam conducted with a chaperone present.   Constitutional:       General: He is not in acute distress.     Appearance: Normal appearance. He is normal weight. He is not ill-appearing.   HENT:      Head: Normocephalic and atraumatic.      Right Ear: External ear normal.      Left Ear: External ear normal.      Nose: Nose normal. No congestion or rhinorrhea.      Mouth/Throat:      Mouth: Mucous membranes are moist.      Pharynx: Oropharynx is clear. No oropharyngeal exudate or posterior oropharyngeal erythema.   Eyes:      General: No scleral icterus.     Extraocular Movements: Extraocular movements intact.      Pupils: Pupils are equal, round, and reactive to light.   Neck:      Musculoskeletal: Normal range of motion and neck supple. No neck rigidity or muscular tenderness.   Cardiovascular:      Rate and Rhythm: Normal rate and regular rhythm.      Pulses: Normal pulses.   Pulmonary:      Effort: Pulmonary effort is normal. No respiratory distress.      Breath sounds: Normal breath sounds. No wheezing or rales.   Abdominal:      General: Abdomen is flat. Bowel sounds are normal. There is no distension.      Palpations: Abdomen is soft.      Tenderness: There is no abdominal tenderness. There is no right CVA tenderness, left CVA tenderness or guarding.   Musculoskeletal: Normal range of motion.         General: No swelling or tenderness.      Right lower leg: No edema.      Left lower leg: No edema.   Lymphadenopathy:      Cervical: No cervical adenopathy.   Skin:     General: Skin is warm and dry.      Coloration: Skin is not jaundiced.      Findings: No lesion.   Neurological:      General: No focal deficit present.      Mental Status: He is alert and oriented to person, place, and time. Mental status is at baseline.      Motor: No weakness.   Psychiatric:         Mood and Affect: Mood normal.         Behavior: Behavior normal.         Labs:    Recent Labs     07/21/20 1735 07/22/20  0127   WBC 12.5* 10.9*   RBC 6.79* 5.38   HEMOGLOBIN 19.5* 15.4   HEMATOCRIT 58.5* 47.6   MCV 86.2 88.5   MCH 28.7 28.6   RDW 38.5 40.3   PLATELETCT 311 234   MPV 10.9 11.1   NEUTSPOLYS 65.60 59.90   LYMPHOCYTES 22.00 25.20   MONOCYTES 10.60 12.50   EOSINOPHILS 0.90 1.40   BASOPHILS 0.50 0.60     Recent Labs     07/21/20  1735 07/22/20  0127 07/22/20  0213   SODIUM 133* 135 137   POTASSIUM 4.7 4.2 4.1   CHLORIDE 82* 96 98   CO2 20 22 22   GLUCOSE 107* 97 94   BUN 40* 32* 31*        UA:  Negative for casts and crystals    Imaging:   US-RENAL   Final Result         1.  Normal renal ultrasound.      CT-ABDOMEN-PELVIS W/O   Final Result         1.  There is no acute inflammatory process within the abdomen or pelvis.   2.  There is no evidence of nephrolithiasis, urolithiasis, or hydronephrosis.           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  -Drugs: pt has polysubstance abuse, including crystal meth, and UDS (+) for cannabis, cocaine, amphetamines  -RPGN: IgA nephropathy (pt reports cafecolored urine)    Lower on differential:  -Unlikely malignancy as CBC wnl  -Infxn as WBC wnl  -TTP/HUS r/o as CBC is wnl    --> F/u on urine qual exam and CKMB (r/o myoglobinuria w/ rhabdo)  --> No casts or crystals on UA  --> F/u on acetaminophen, salicylate labs  --> Day team to consider kidney biopsy for r/o of IgA nephropathy  --> Day team to consider another doppler flow studies of renal arteries to look for renal artery stenosis, or vein thrombosis    -->Continue aggressive fluid hydration  -->Does not meet any indication for emergent dialysis    High anion gap metabolic acidosis- (present on admission)  Assessment & Plan  -Toxin ingestion vs alcohol  -F/u on serum  "osm  -Continue aggressive IVF  -Trend BMP    Hypochloremia- (present on admission)  Assessment & Plan  -2/2 Vomiting  -Continue aggressive IVF      Polycythemia- (present on admission)  Assessment & Plan  -Possibly testosterone or anabolic steroid ingestion from \"vitamins\" for building muscles for construction job  -F/u on epo lab    Urinary tract infection- (present on admission)  Assessment & Plan  -UA shows UTI  -Do not treat as pt is asymptomatic       Quality Measures:  Code: Full  VTE: Heparin  Diet: NPO  Disposition: Remain inpatient    Anny Baig MD, MPH  [facility], PGY-1      "

## 2020-07-22 NOTE — SENIOR ADMIT NOTE
"19-year-old male admitted for abdominal pain/vomiting, in the setting of severe KRISSY, UTI and metabolic derangements.  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  Also reports meth and alcohol use.      Afebrile.  Tachycardic 153 on admission, now stable 60s.  Otherwise hemodynamically stable on room air  Young Yoruba-speaking male lying supine in bed NAD.  Communicative and cooperative.  Cousin at bedside  Dry mucous membranes  Cardiopulmonary/abdominal WNL  No obvious rashes, track marks.  Able to move all extremities, not toxic appearing or intoxicated appearing      Assessment and plan:  1.  Acute kidney injury, severe, unknown baseline  2.  High anion gap metabolic acidosis,  3.  Abdominal pain/vomiting, possibly due to polysubstance abuse  4.  Urinary tract infection, asymptomatic  5.  Tachycardia/ Tachypnea, likely secondary to volume depletion  6.  Leukocytosis, mild  7.  Polycythemia, could be concentrated  8.  Hyponatremia, mild, now resolved  9. Hypochloremia, likely 2/2 emesis  10.  Hypocalcemia, psuedo  11.  Elevated alkaline phosphatase  12.  Hyperalbuminemia  13.  Hyperbilirubinuria  14.  Microscopic hematuria  15.  EKG abnormalities, LVH and possible early repolarization    -BUN/CR <10 indicating intrinsic etiologies, but this can also include subacute prerenal from dehydration (works outside construction, questionable hydration + polysubstances may act as a diuretic).  Reported dark urine, which certainly favors ATN from hemoglobinuria/myoglobinuria or IgA nephropathy.  Spoke with the lab, no muddy brown casts or calcium oxalate crystals.  Reviewed, medications to cause AIN/ATN, patient denies taking any NSAIDs.  Likely etiology is polysubstance abuse, UDS positive, and likely prerenal.  -No indication for emergent dialysis.  BUN 31  -Aggressive IVF via LR  -Substance abuse counseling when appropriate; also " "could probably stand to discontinue \"supplements\"      To contact [facility] night team (5:30 PM-5:30 AM)  Primary team color: Purple  Intern: Safia  Senior: Elena        "

## 2020-07-23 ENCOUNTER — PATIENT OUTREACH (OUTPATIENT)
Dept: HEALTH INFORMATION MANAGEMENT | Facility: OTHER | Age: 20
End: 2020-07-23

## 2020-07-23 VITALS
RESPIRATION RATE: 17 BRPM | OXYGEN SATURATION: 97 % | SYSTOLIC BLOOD PRESSURE: 130 MMHG | TEMPERATURE: 97.6 F | WEIGHT: 160 LBS | HEART RATE: 88 BPM | DIASTOLIC BLOOD PRESSURE: 67 MMHG

## 2020-07-23 PROBLEM — E87.8 HYPOCHLOREMIA: Status: RESOLVED | Noted: 2020-07-22 | Resolved: 2020-07-23

## 2020-07-23 PROBLEM — D75.1 POLYCYTHEMIA: Status: RESOLVED | Noted: 2020-07-22 | Resolved: 2020-07-23

## 2020-07-23 PROBLEM — N17.9 ACUTE KIDNEY INJURY (HCC): Status: RESOLVED | Noted: 2020-07-22 | Resolved: 2020-07-23

## 2020-07-23 PROBLEM — N39.0 URINARY TRACT INFECTION: Status: RESOLVED | Noted: 2020-07-22 | Resolved: 2020-07-23

## 2020-07-23 PROBLEM — E87.29 HIGH ANION GAP METABOLIC ACIDOSIS: Status: RESOLVED | Noted: 2020-07-22 | Resolved: 2020-07-23

## 2020-07-23 LAB
ANION GAP SERPL CALC-SCNC: 11 MMOL/L (ref 7–16)
BUN SERPL-MCNC: 18 MG/DL (ref 8–22)
CALCIUM SERPL-MCNC: 9 MG/DL (ref 8.5–10.5)
CHLORIDE SERPL-SCNC: 104 MMOL/L (ref 96–112)
CO2 SERPL-SCNC: 23 MMOL/L (ref 20–33)
CREAT SERPL-MCNC: 0.68 MG/DL (ref 0.5–1.4)
EPO SERPL-ACNC: 1 MU/ML (ref 4–27)
ERYTHROCYTE [DISTWIDTH] IN BLOOD BY AUTOMATED COUNT: 40.1 FL (ref 35.9–50)
GLUCOSE SERPL-MCNC: 81 MG/DL (ref 65–99)
HCT VFR BLD AUTO: 42.9 % (ref 42–52)
HGB BLD-MCNC: 13.5 G/DL (ref 14–18)
MCH RBC QN AUTO: 28.7 PG (ref 27–33)
MCHC RBC AUTO-ENTMCNC: 31.5 G/DL (ref 33.7–35.3)
MCV RBC AUTO: 91.3 FL (ref 81.4–97.8)
PLATELET # BLD AUTO: 207 K/UL (ref 164–446)
PMV BLD AUTO: 11.1 FL (ref 9–12.9)
POTASSIUM SERPL-SCNC: 4.6 MMOL/L (ref 3.6–5.5)
RBC # BLD AUTO: 4.7 M/UL (ref 4.7–6.1)
SODIUM SERPL-SCNC: 138 MMOL/L (ref 135–145)
WBC # BLD AUTO: 7.6 K/UL (ref 4.8–10.8)

## 2020-07-23 PROCEDURE — 99238 HOSP IP/OBS DSCHRG MGMT 30/<: CPT | Mod: GC | Performed by: HOSPITALIST

## 2020-07-23 PROCEDURE — A9270 NON-COVERED ITEM OR SERVICE: HCPCS | Performed by: STUDENT IN AN ORGANIZED HEALTH CARE EDUCATION/TRAINING PROGRAM

## 2020-07-23 PROCEDURE — 700102 HCHG RX REV CODE 250 W/ 637 OVERRIDE(OP): Performed by: STUDENT IN AN ORGANIZED HEALTH CARE EDUCATION/TRAINING PROGRAM

## 2020-07-23 PROCEDURE — 36415 COLL VENOUS BLD VENIPUNCTURE: CPT

## 2020-07-23 PROCEDURE — 700111 HCHG RX REV CODE 636 W/ 250 OVERRIDE (IP): Performed by: STUDENT IN AN ORGANIZED HEALTH CARE EDUCATION/TRAINING PROGRAM

## 2020-07-23 PROCEDURE — 85027 COMPLETE CBC AUTOMATED: CPT

## 2020-07-23 PROCEDURE — 80048 BASIC METABOLIC PNL TOTAL CA: CPT

## 2020-07-23 RX ADMIN — HEPARIN SODIUM 5000 UNITS: 5000 INJECTION, SOLUTION INTRAVENOUS; SUBCUTANEOUS at 05:07

## 2020-07-23 RX ADMIN — DOCUSATE SODIUM 50 MG AND SENNOSIDES 8.6 MG 2 TABLET: 8.6; 5 TABLET, FILM COATED ORAL at 05:08

## 2020-07-23 NOTE — DISCHARGE INSTRUCTIONS
por favor, de hacer de sequimiento para rinones con doctor primario.       Discharge Instructions    Discharged to home by car with relative. Discharged via walking, hospital escort: Yes.  Special equipment needed: Not Applicable    Be sure to schedule a follow-up appointment with your primary care doctor or any specialists as instructed.     Discharge Plan:   Diet Plan: Discussed  Activity Level: Discussed  Confirmed Follow up Appointment: Patient to Call and Schedule Appointment  Confirmed Symptoms Management: Discussed  Medication Reconciliation Updated: Yes    I understand that a diet low in cholesterol, fat, and sodium is recommended for good health. Unless I have been given specific instructions below for another diet, I accept this instruction as my diet prescription.   Other diet: n/a      Special Instructions: None    · Is patient discharged on Warfarin / Coumadin?   No     Depression / Suicide Risk    As you are discharged from this [facility] facility, it is important to learn how to keep safe from harming yourself.    Recognize the warning signs:  · Abrupt changes in personality, positive or negative- including increase in energy   · Giving away possessions  · Change in eating patterns- significant weight changes-  positive or negative  · Change in sleeping patterns- unable to sleep or sleeping all the time   · Unwillingness or inability to communicate  · Depression  · Unusual sadness, discouragement and loneliness  · Talk of wanting to die  · Neglect of personal appearance   · Rebelliousness- reckless behavior  · Withdrawal from people/activities they love  · Confusion- inability to concentrate     If you or a loved one observes any of these behaviors or has concerns about self-harm, here's what you can do:  · Talk about it- your feelings and reasons for harming yourself  · Remove any means that you might use to hurt yourself (examples: pills, rope, extension cords, firearm)  · Get professional help  from the community (Mental Health, Substance Abuse, psychological counseling)  · Do not be alone:Call your Safe Contact- someone whom you trust who will be there for you.  · Call your local CRISIS HOTLINE 166-9388 or 024-397-1031  · Call your local Children's Mobile Crisis Response Team [place] (943) 515-2553 or www.VSporto  · Call the toll free National Suicide Prevention Hotlines   · National Suicide Prevention Lifeline 723-815-WXKY (9568)  · National Hope Line Network 800-SUICIDE (029-2262)

## 2020-07-23 NOTE — CARE PLAN
Problem: Safety  Goal: Will remain free from injury  Outcome: PROGRESSING AS EXPECTED   Bed is in low, locked position.  Call light and belongings are within reach.      Problem: Venous Thromboembolism (VTW)/Deep Vein Thrombosis (DVT) Prevention:  Goal: Patient will participate in Venous Thrombosis (VTE)/Deep Vein Thrombosis (DVT)Prevention Measures  Outcome: PROGRESSING AS EXPECTED   Unfractionated heparin as ordered

## 2020-07-23 NOTE — DISCHARGE PLANNING
Care Transition Team Assessment    Per MD's Notes:  20 yo male admitted with acute kidney injury. Present on admission. Dx.: ischemia vs. Drugs vs. Autoimmune. 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 Patient has polysubstance abuse, including crystal meth, and UDS + for cannabis, cocaine, amphetamines. Patient reports cafe colored urine.     Anticipated Discharge Disposition: Home to his prior living situation, patient lives with his partients    Action: This RN case manager provided patient with resources for substance abuse cessation, had  at bedside. We conveyed to the patient how important it is to stop using drugs and be seen at the [facility] for follow up.     Barriers to Discharge: Clinical improvement, monitoring labs, IVF's, doppler studies of renal arteries to look for renal artery stenosis or vein thomrbosis.     PCP: No PCP, undocumented. I gave him resources for the [facility]  Rx. Does not have one  HH: does not have one  SNF: never  INCOME: yes, did not state to me how much income per month. He is undocumented  DME: none, ambulatory    Plan: Discharge home to his prior living situation.     Information Source  Orientation : Oriented x 4  Information Given By: Patient  Informant's Name: Aki Duron  Who is responsible for making decisions for patient? : Patient    Readmission Evaluation  Is this a readmission?: No    Elopement Risk  Legal Hold: No  Ambulatory or Self Mobile in Wheelchair: Yes  Disoriented: No  Psychiatric Symptoms: None  History of Wandering: No  Elopement this Admit: No  Vocalizing Wanting to Leave: No  Displays Behaviors, Body Language Wanting to Leave: No-Not at Risk for Elopement  Elopement Risk: Not at Risk for Elopement    Interdisciplinary Discharge Planning  Does Admitting Nurse Feel This Could be a Complex Discharge?: No  Lives with - Patient's Self Care Capacity: Other (Comments), Child Less than 18 Years of Age(Lives with  relatives)  Patient or legal guardian wants to designate a caregiver (see row info): No  Support Systems: Parent, Family Member(s)  Housing / Facility: 2 Story Apartment / Condo  Do You Take your Prescribed Medications Regularly: (No medications)  Able to Return to Previous ADL's: Yes  Mobility Issues: No  Prior Services: None  Patient Expects to be Discharged to:: Home  Assistance Needed: No  Durable Medical Equipment: Not Applicable    Discharge Preparedness  What is your plan after discharge?: Home with help  What are your discharge supports?: Parent  Prior Functional Level: Ambulatory, Independent with Medication Management  Difficulity with ADLs: None  Difficulity with IADLs: None    Functional Assesment  Prior Functional Level: Ambulatory, Independent with Medication Management    Finances  Financial Barriers to Discharge: No  Prescription Coverage: Yes    Vision / Hearing Impairment  Vision Impairment : No  Hearing Impairment : No    Advance Directive  Advance Directive?: None  Advance Directive offered?: AD Booklet refused    Domestic Abuse  Have you ever been the victim of abuse or violence?: No  Physical Abuse or Sexual Abuse: No  Verbal Abuse or Emotional Abuse: No  Possible Abuse Reported to:: Not Applicable    Psychological Assessment  History of Substance Abuse: Alcohol, Amphetamines, Cocaine, Marijuana, Methamphetamine  Date Last Used - Alcohol: (7/21/2020)  Date Last Used - Amphetamines: (7/21/2020)  Date Last Used - Cocaine: (7/21/2020)  Date Last Used - Marijuana: (7/21/2020)  Date Last Used - Methamphetamine: (7/21/2020)  Substance Abuse Comments: (None)  History of Psychiatric Problems: (unknown)  Non-compliant with Treatment: Yes    Discharge Risks or Barriers  Discharge risks or barriers?: No PCP, Substance abuse, Non-adherence to medication or treatment  Patient risk factors: Substance abuse    Anticipated Discharge Information  Anticipated discharge disposition: Home  Discharge Address: [id number]  [place] [address]  Discharge Contact Phone Number: [phone]    Erica Flores RN,

## 2020-07-23 NOTE — PROGRESS NOTES
Received report and assumed care at shift change. Patient is A&O x 4, denies pain or any distress. No nausea or vomiting noted at this time. Needs attended to.

## 2020-07-23 NOTE — PROGRESS NOTES
Bedside report received at change of shift. No indication of distress or discomfort. No major events overnight.    Pt is axox 4. Denies pain, denies any N/V/D.  Pt ate dinner last night and is currently eating breakfast with no issues. Denies abdominal tenderness, bowel sounds are normoactive in all 4 quadrants. Assessment complete, pt calm and cooperative with staff.    Needs attended. No additional needs at this time.  Call light and personal belongings within reach. Bed in low locked position. Hourly rounding in place.

## 2020-07-23 NOTE — PROGRESS NOTES
Late entry- Pt dc'd at 1430. IV removed. Pt left unit walking with CNA escort. Personal belongings with pt when leaving unit. Pt given discharge instructions prior to leaving unit including where to  prescriptions and when to follow-up; verbalizes understanding. RYAN Ely assisted with translation. Copy of discharge instructions with pt and in the chart.

## 2020-07-23 NOTE — DISCHARGE SUMMARY
"Discharge Summary    CHIEF COMPLAINT ON ADMISSION  Chief Complaint   Patient presents with   • Abdominal Pain   • N/V       Reason for Admission  OTHER     Admission Date  7/21/2020    CODE STATUS  Full Code    HPI & HOSPITAL COURSE  This is a 19 y.o. male admitted for abdominal pain, vomiting.    HPI as following:  Aki Duron is a 19 y.o. male with no significant PMH who presented 7/21/2020 with 2 days of abdominal pain, nausea and vomiting. 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 Around 2 PM, after drinking his usual juice with \"vitamins\", pt started feeling dizzy as if everything was spinning, and vomited all of his lunch. Afterward, he attempted to drink water to re-hydrate but kept on vomiting and had to leave work. Throughout the next 24 hours, he continued trying to drink water and intake food but kept on throwing everything up. He decided to come to the hospital for treating his symptoms.  Of note, patient has been taking \"vitamins\" in juice for the past 11 months ever since he immigrated to the [place] in [place]. 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 He revealed he has also been snorting crystal meth and smokes marijuana on the weekends since November 2019 with his friends. He normally drinks 1.5 gallons of water while at his construction job. He also drinks at least 7 beers a day on the weekend and sometimes up to 30 beers. Occasionally, when he doesn't have money, he drinks \"something\" that his friends give him that also gives him a similar effect as beer. He states that since he has arrived in the [place], he has been having \"cafe\"-colored urine, which he attributes to a change in diet after immigrating from [place]    In ED, work up for acute abdomen with concern for appendicitis was undertaken, found to have acute renal failure with asymptomatic UTI. Started IVF, given zofran, ceftriaxone, and " metronidazole. Admitted for concerns of acute renal failure, with pending consult to nephrology.  While admitted, responded significantly to fluid resuscitation. Concerns of high gap metabolic acidosis and acute kidney injury improved. After fluids, began experiencing significant improvement of symptoms--abdominal pain, nausea. Was transitioned off of IVF to oral hydration with good tolerance. Continued in resolution of symptoms, and was ready for discharge, but needing to establish new primary care follow up, he was given information to [facility], and counseled on substance abuse.       Therefore, he is discharged in good and stable condition to home with close outpatient follow-up.    The patient recovered much more quickly than anticipated on admission.     Physical exam at time of discharge:  General: well appearing, pleasant, no acute distress  HEENT: atraumatic, normocephalic. PERRL. Oral mucosa moist.  Cardiac: RRR, no m/g/r  Pulmonary: CTAB  Abdomen: soft, nontender, nondistended  MSK: shoulder flexion/extension 5/5 bilaterally, ambulating independently.      Discharge Date  07/23/20      FOLLOW UP ITEMS POST DISCHARGE  Needs to establish primary care provider/clinic, given information to [facility]; follow up resolution of KRISSY.    DISCHARGE DIAGNOSES  Active Problems:    * No active hospital problems. *  Resolved Problems:    Acute kidney injury (HCC) POA: Yes    High anion gap metabolic acidosis POA: Yes    Hypochloremia POA: Yes    Polycythemia POA: Yes    Urinary tract infection POA: Yes      FOLLOW UP  No future appointments.  [facility]    Schedule an appointment as soon as possible for a visit in 1 week  por favor, de hacer melissa de seguimiento para rinones       MEDICATIONS ON DISCHARGE     Medication List      You have not been prescribed any medications.         Allergies  No Known Allergies    DIET  Orders Placed This Encounter   Procedures   • Diet Order Regular     Standing Status:   Standing      Number of Occurrences:   1     Order Specific Question:   Diet:     Answer:   Regular [1]       ACTIVITY  As tolerated.  Weight bearing as tolerated    CONSULTATIONS  n/a    PROCEDURES  n/a    LABORATORY  Lab Results   Component Value Date    SODIUM 138 07/23/2020    POTASSIUM 4.6 07/23/2020    CHLORIDE 104 07/23/2020    CO2 23 07/23/2020    GLUCOSE 81 07/23/2020    BUN 18 07/23/2020    CREATININE 0.68 07/23/2020        Lab Results   Component Value Date    WBC 7.6 07/23/2020    HEMOGLOBIN 13.5 (L) 07/23/2020    HEMATOCRIT 42.9 07/23/2020    PLATELETCT 207 07/23/2020        Total time of the discharge process exceeds 35 minutes.

## 2020-07-23 NOTE — CARE PLAN
Problem: Safety  Goal: Will remain free from injury  Outcome: PROGRESSING AS EXPECTED   Bed is in low, locked position.  Call light and belongings are within reach.      Problem: Venous Thromboembolism (VTW)/Deep Vein Thrombosis (DVT) Prevention:  Goal: Patient will participate in Venous Thrombosis (VTE)/Deep Vein Thrombosis (DVT)Prevention Measures  Outcome: PROGRESSING AS EXPECTED   Mechanical prophylaxis - pt is ambulatory

## 2024-08-23 NOTE — ASSESSMENT & PLAN NOTE
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